# Patient Record
Sex: FEMALE | Race: WHITE | NOT HISPANIC OR LATINO
[De-identification: names, ages, dates, MRNs, and addresses within clinical notes are randomized per-mention and may not be internally consistent; named-entity substitution may affect disease eponyms.]

---

## 2018-01-18 ENCOUNTER — APPOINTMENT (OUTPATIENT)
Dept: OBGYN | Facility: CLINIC | Age: 43
End: 2018-01-18
Payer: COMMERCIAL

## 2018-01-18 VITALS
WEIGHT: 249 LBS | HEIGHT: 66 IN | SYSTOLIC BLOOD PRESSURE: 120 MMHG | BODY MASS INDEX: 40.02 KG/M2 | DIASTOLIC BLOOD PRESSURE: 84 MMHG

## 2018-01-18 DIAGNOSIS — Z80.3 FAMILY HISTORY OF MALIGNANT NEOPLASM OF BREAST: ICD-10-CM

## 2018-01-18 PROCEDURE — 99396 PREV VISIT EST AGE 40-64: CPT

## 2018-01-22 LAB
CYTOLOGY CVX/VAG DOC THIN PREP: NORMAL
HPV HIGH+LOW RISK DNA PNL CVX: NOT DETECTED

## 2018-02-01 ENCOUNTER — TRANSCRIPTION ENCOUNTER (OUTPATIENT)
Age: 43
End: 2018-02-01

## 2019-02-12 ENCOUNTER — APPOINTMENT (OUTPATIENT)
Dept: OBGYN | Facility: CLINIC | Age: 44
End: 2019-02-12
Payer: COMMERCIAL

## 2019-02-12 VITALS
DIASTOLIC BLOOD PRESSURE: 70 MMHG | SYSTOLIC BLOOD PRESSURE: 120 MMHG | BODY MASS INDEX: 40.66 KG/M2 | HEIGHT: 66 IN | WEIGHT: 253 LBS

## 2019-02-12 PROCEDURE — 99396 PREV VISIT EST AGE 40-64: CPT

## 2019-02-14 LAB — HPV HIGH+LOW RISK DNA PNL CVX: NOT DETECTED

## 2019-02-18 LAB — CYTOLOGY CVX/VAG DOC THIN PREP: NORMAL

## 2019-04-26 ENCOUNTER — TRANSCRIPTION ENCOUNTER (OUTPATIENT)
Age: 44
End: 2019-04-26

## 2020-04-16 ENCOUNTER — APPOINTMENT (OUTPATIENT)
Dept: OBGYN | Facility: CLINIC | Age: 45
End: 2020-04-16
Payer: COMMERCIAL

## 2020-04-16 VITALS
SYSTOLIC BLOOD PRESSURE: 125 MMHG | DIASTOLIC BLOOD PRESSURE: 85 MMHG | HEIGHT: 66 IN | BODY MASS INDEX: 43.71 KG/M2 | HEART RATE: 87 BPM | WEIGHT: 272 LBS

## 2020-04-16 PROCEDURE — 99214 OFFICE O/P EST MOD 30 MIN: CPT | Mod: 25

## 2020-04-16 PROCEDURE — 56405 I&D VULVA/PERINEAL ABSCESS: CPT

## 2020-04-16 RX ORDER — CEPHALEXIN 500 MG/1
500 CAPSULE ORAL
Qty: 10 | Refills: 0 | Status: ACTIVE | COMMUNITY
Start: 2020-04-16 | End: 1900-01-01

## 2020-04-16 NOTE — PHYSICAL EXAM
[Labia Majora Erythema Right] : erythema of the right labia majora [de-identified] : 2cm abscess to right labia, tender, fluctant

## 2020-04-20 LAB — BACTERIA WND CULT: ABNORMAL

## 2021-02-04 ENCOUNTER — APPOINTMENT (OUTPATIENT)
Dept: OBGYN | Facility: CLINIC | Age: 46
End: 2021-02-04
Payer: COMMERCIAL

## 2021-02-04 VITALS
SYSTOLIC BLOOD PRESSURE: 114 MMHG | HEART RATE: 69 BPM | DIASTOLIC BLOOD PRESSURE: 80 MMHG | WEIGHT: 252 LBS | BODY MASS INDEX: 40.5 KG/M2 | HEIGHT: 66 IN

## 2021-02-04 PROCEDURE — 99072 ADDL SUPL MATRL&STAF TM PHE: CPT

## 2021-02-04 PROCEDURE — 99396 PREV VISIT EST AGE 40-64: CPT

## 2021-02-04 NOTE — HISTORY OF PRESENT ILLNESS
[FreeTextEntry1] : 46 yo here for av. She has no gyn complaints today. Has a small sore lump 2 weeks ago on labia but it resolved with wrm compresses.  Her  had vasectomy. No family h/o gyn or colon cancer. [PGxTotal] : 7 [PGHxAbortions] : 1 [[facility]] : 4 [PGHxABSpont] : 2

## 2021-02-06 LAB — HPV HIGH+LOW RISK DNA PNL CVX: NOT DETECTED

## 2021-02-09 LAB — CYTOLOGY CVX/VAG DOC THIN PREP: NORMAL

## 2021-08-13 ENCOUNTER — NON-APPOINTMENT (OUTPATIENT)
Age: 46
End: 2021-08-13

## 2021-08-13 ENCOUNTER — APPOINTMENT (OUTPATIENT)
Dept: FAMILY MEDICINE | Facility: CLINIC | Age: 46
End: 2021-08-13
Payer: COMMERCIAL

## 2021-08-13 VITALS
HEART RATE: 106 BPM | SYSTOLIC BLOOD PRESSURE: 116 MMHG | HEIGHT: 66 IN | TEMPERATURE: 98.1 F | WEIGHT: 273 LBS | DIASTOLIC BLOOD PRESSURE: 84 MMHG | OXYGEN SATURATION: 98 % | BODY MASS INDEX: 43.87 KG/M2 | RESPIRATION RATE: 18 BRPM

## 2021-08-13 DIAGNOSIS — Z13.29 ENCOUNTER FOR SCREENING FOR OTHER SUSPECTED ENDOCRINE DISORDER: ICD-10-CM

## 2021-08-13 DIAGNOSIS — Z13.1 ENCOUNTER FOR SCREENING FOR DIABETES MELLITUS: ICD-10-CM

## 2021-08-13 DIAGNOSIS — G43.109 MIGRAINE WITH AURA, NOT INTRACTABLE, W/OUT STATUS MIGRAINOSUS: ICD-10-CM

## 2021-08-13 DIAGNOSIS — Z13.220 ENCOUNTER FOR SCREENING FOR LIPOID DISORDERS: ICD-10-CM

## 2021-08-13 DIAGNOSIS — Z76.89 PERSONS ENCOUNTERING HEALTH SERVICES IN OTHER SPECIFIED CIRCUMSTANCES: ICD-10-CM

## 2021-08-13 PROCEDURE — G0447 BEHAVIOR COUNSEL OBESITY 15M: CPT

## 2021-08-13 PROCEDURE — 99203 OFFICE O/P NEW LOW 30 MIN: CPT | Mod: 25

## 2021-09-23 DIAGNOSIS — R79.0 ABNORMAL LVL OF BLOOD MINERAL: ICD-10-CM

## 2022-04-14 ENCOUNTER — APPOINTMENT (OUTPATIENT)
Dept: ORTHOPEDIC SURGERY | Facility: CLINIC | Age: 47
End: 2022-04-14
Payer: COMMERCIAL

## 2022-04-14 VITALS — WEIGHT: 273 LBS | BODY MASS INDEX: 43.87 KG/M2 | HEIGHT: 66 IN

## 2022-04-14 DIAGNOSIS — M17.12 UNILATERAL PRIMARY OSTEOARTHRITIS, LEFT KNEE: ICD-10-CM

## 2022-04-14 PROCEDURE — 99213 OFFICE O/P EST LOW 20 MIN: CPT

## 2022-04-14 NOTE — IMAGING
[de-identified] : The patient is a well appearing 46 year old F of their stated age.\par Patient ambulates with a normal gait.\par Negative straight leg raise bilateral\par \par Well healed surgical incision\par \par Effected Knee: left \par ROM: 0-145 degrees\par Lachman: 2A\par Pivot Shift: Negative\par Anterior Drawer: Negative\par Posterior Drawer / Sag:Negative\par Varus Stress 0 degrees: Stable\par Varus Stress 30 degrees: Stable\par Valgus Stress 0 degrees: Stable\par Valgus Stress 30 degrees: Stable\par Medial Brian: +\par Lateral Brian: Negative\par Patella Glide: 2+\par Patella Apprehension: Negative\par Patella Grind: Negative\par Palpation:\par Medial Joint Line: Tender\par Lateral Joint Line: Nontender\par Medial Collateral Ligament: Nontender\par Lateral Collateral Ligament/PLC: Nontender\par Distal Femur: Nontender\par Proximal Tibia: Nontender\par Tibial Tubercle: Nontender\par Distal Pole Patella: Nontender\par Quadriceps Tendon: Nontender & Intact\par Patella Tendon: Nontender & Intact\par Medial Distal Hamstring/PES: Nontender\par Lateral Distal Hamstring: Nontender & Stable\par Iliotibial Band: Nontender\par Medial Patellofemoral Ligament: Nontender\par Proximal GSC-Plantaris: Nontender\par Calf: Supple & Nontender\par Inspection:\par Deformity: No\par Erythema: No\par Ecchymosis: No\par Abrasions: No\par Effusion: No\par Prepatella Bursitis: No\par Neurologic Exam:\par Sensation L4-S1: Grossly Intact\par Motor Exam:\par Quadriceps: 5 out of 5\par Hamstrings: 5 out of 5\par EHL: 5 out of 5\par FHL: 5 out of 5\par TA: 5 out of 5\par GS: 5 out of 5\par Circulatory/Pulses:\par Dorsalis Pedis: 2+\par Posterior Tibialis: 2+\par Additional Pertinent Findings: None\par Contralateral Knee: \par ROM: 0-145 degrees\par Other Pertinent Findings: None\par \par Assessment: The patient is a 46 year old F with left knee pain and radiographic and physical exam findings consistent with left knee OA\par \par The patient’s condition is acute \par Documents/Results Reviewed Today: None\par Tests/Studies Independently Interpreted Today: None \par Pertinent findings include: medial jt line tendernss, medial melida, well healed surgical incision, 2A lachman, patella crepitus \par Confounding medical conditions/concerns: none\par \par Plan: Patient reports 70% relief from CSI. Patient will continue HEP and ice. Discussed possible HA injections if pain worsens. \par Tests Ordered: none\par Prescription Medications Ordered: none\par Braces/DME Ordered: none\par Activity/Work/Sports Status: \par Additional Instructions: none\par Follow up: prn\par \par The patient's current medication management of their orthopedic diagnosis was reviewed today:\par \par (1) We discussed a comprehensive treatment plan that included possible pharmaceutical management involving the use of prescription strength medications including but not limited to options such as oral Naprosyn 500mg BID, once daily Meloxicam 15 mg, or 500-650 mg Tylenol versus over the counter oral medications and topical prescription NSAID Pennsaid vs over the counter Voltaren gel.\par \par (2) There is a moderate risk of morbidity with further treatment, especially from use of prescription strength medications and possible side effects of these medications which include upset stomach with oral medications, skin reactions to topical medications and cardiac/renal issues with long term use.\par \par (3) I recommended that the patient follow-up with their medical physician to discuss any significant specific potential issues with long term medication use such as interactions with current medications or with exacerbation of underlying medical comorbidities.\par \par (4) The benefits and risks associated with use of injectable, oral or topical, prescription and over the counter anti-inflammatory medications were discussed with the patient. The patient voiced understanding of the risks including but not limited to bleeding, stroke, kidney dysfunction, heart disease, and were referred to the black box warning label for further information.\par \par Klarissa HORTA attest that this documentation has been prepared under the direction and in the presence of provider Dr. Juárez.\par \par The documentation recorded by the scribe accurately reflects the service I personally performed and the decisions made by me.\par The patient was seen by me under the direct supervision of Dr. Jaylon Juárez\par

## 2022-04-14 NOTE — HISTORY OF PRESENT ILLNESS
[de-identified] : The patient is a 46 year old right hand dominant female who presents today complaining of left knee pain. \par Date of Injury/Onset: December 2021\par Pain: At Rest: 0/10 \par With Activity: 410 \par Mechanism of injury: Sudden onset of pain over anterior knee, sometimes posterior. \par This is not a Work Related Injury being treated under Worker's Compensation.\par This is not an athletic injury occurring associated with an interscholastic or organized sports team.\par Quality of symptoms: intermittent Throbbing at night, pressure, buckled once. Some instability in the AM when going\par down the stairs. \par Improves with: Rest\par Worse with: walking\par Treatment/Imaging/Studies Since Last Visit: PT and CSI inj\par 	Reports Available For Review Today: None\par Out of work/sport: _, since currently working\par School/Sport/Position/Occupation: \par Changes since last visit: Feeling about the same. Mild improvement. States the inj didn't help. Also states when she stopped doing PT she felt better. \par Additional Information: None

## 2022-08-23 ENCOUNTER — APPOINTMENT (OUTPATIENT)
Dept: OBGYN | Facility: CLINIC | Age: 47
End: 2022-08-23

## 2022-08-23 ENCOUNTER — NON-APPOINTMENT (OUTPATIENT)
Age: 47
End: 2022-08-23

## 2022-08-23 VITALS
BODY MASS INDEX: 43.87 KG/M2 | HEIGHT: 66 IN | WEIGHT: 273 LBS | SYSTOLIC BLOOD PRESSURE: 120 MMHG | DIASTOLIC BLOOD PRESSURE: 70 MMHG

## 2022-08-23 DIAGNOSIS — E66.01 MORBID (SEVERE) OBESITY DUE TO EXCESS CALORIES: ICD-10-CM

## 2022-08-23 DIAGNOSIS — Z12.31 ENCOUNTER FOR SCREENING MAMMOGRAM FOR MALIGNANT NEOPLASM OF BREAST: ICD-10-CM

## 2022-08-23 DIAGNOSIS — R92.2 INCONCLUSIVE MAMMOGRAM: ICD-10-CM

## 2022-08-23 PROCEDURE — 99396 PREV VISIT EST AGE 40-64: CPT

## 2022-08-23 NOTE — HISTORY OF PRESENT ILLNESS
[Currently Active] : currently active [Men] : men [Vaginal] : vaginal [No] : No [FreeTextEntry1] : 47 yo reg cycles , 4 days long 1 heavy day. SHe has another abscess to right labia. for 3 weeks, getting a bit more painfull.

## 2022-08-23 NOTE — DISCUSSION/SUMMARY
[FreeTextEntry1] : emycin for prevention of labial abscess\par bactrium for the existing lesion -cultured today\par Discussed with pt the importance of exercise 2.5 Hours min. a week as per the [employer] , Calcium equalling 1000 and vitamin D 1000 iu daily . SHe will return in one year. sent for mammo. , discussed normal signs and sx of perimenopause  normal vs not. . Importance of follow up.Answered any questions she may have. Encouraged SBE \par \par

## 2022-08-23 NOTE — PHYSICAL EXAM
[Appropriately responsive] : appropriately responsive [Alert] : alert [No Acute Distress] : no acute distress [No Lymphadenopathy] : no lymphadenopathy [Soft] : soft [Non-tender] : non-tender [Non-distended] : non-distended [No HSM] : No HSM [No Lesions] : no lesions [No Mass] : no mass [Oriented x3] : oriented x3 [Examination Of The Breasts] : a normal appearance [No Masses] : no breast masses were palpable [Labia Majora] : normal [Labia Minora] : normal [Normal] : normal [Uterine Adnexae] : normal [FreeTextEntry4] : open abscess to the right labia

## 2022-08-25 ENCOUNTER — APPOINTMENT (OUTPATIENT)
Dept: ORTHOPEDIC SURGERY | Facility: CLINIC | Age: 47
End: 2022-08-25

## 2022-08-25 VITALS — BODY MASS INDEX: 12.05 KG/M2 | WEIGHT: 75 LBS | HEIGHT: 66 IN

## 2022-08-25 LAB — HPV HIGH+LOW RISK DNA PNL CVX: NOT DETECTED

## 2022-08-25 PROCEDURE — 99214 OFFICE O/P EST MOD 30 MIN: CPT

## 2022-08-25 NOTE — HISTORY OF PRESENT ILLNESS
[de-identified] : The patient is a 46 year old right hand dominant female who presents today complaining of left knee pain. \par Date of Injury/Onset: December 2021\par Pain: At Rest: 0/10 \par With Activity: 5/10 \par Mechanism of injury: Sudden onset of pain over anterior knee, sometimes posterior. \par This is not a Work Related Injury being treated under Worker's Compensation.\par This is not an athletic injury occurring associated with an interscholastic or organized sports team.\par Quality of symptoms: intermittent Throbbing at night, pressure, clicking\par Improves with: Rest, PT\par Worse with: prolonged walking\par Treatment/Imaging/Studies Since Last Visit: PT \par 	Reports Available For Review Today: None\par Out of work/sport: currently working\par School/Sport/Position/Occupation: \par Changes since last visit: Pt is still feeling pain since last visit in April. She would like to consider visco injections\par Additional Information: None

## 2022-08-25 NOTE — IMAGING
[de-identified] : The patient is a well appearing 46 year old F of their stated age.\par Patient ambulates with a normal gait.\par Negative straight leg raise bilateral\par \par Effected Knee: left \par ROM: 0-145 degrees\par Lachman: 2A\par Pivot Shift: Negative\par Anterior Drawer: Negative\par Posterior Drawer / Sag:Negative\par Varus Stress 0 degrees: Stable\par Varus Stress 30 degrees: Stable\par Valgus Stress 0 degrees: Stable\par Valgus Stress 30 degrees: Stable\par Medial Brian: +\par Lateral Brian: Negative\par Patella Glide: 2+\par Patella Apprehension: Negative\par Patella Grind: +\par \par Palpation:\par Medial Joint Line: Tender\par Lateral Joint Line: Nontender\par Medial Collateral Ligament: Nontender\par Lateral Collateral Ligament/PLC: Nontender\par Distal Femur: Nontender\par Proximal Tibia: Nontender\par Tibial Tubercle: Nontender\par Distal Pole Patella: Nontender\par Quadriceps Tendon: Nontender & Intact\par Patella Tendon: Nontender & Intact\par Medial Distal Hamstring/PES: Nontender\par Lateral Distal Hamstring: Nontender & Stable\par Iliotibial Band: Nontender\par Medial Patellofemoral Ligament: Nontender\par Proximal GSC-Plantaris: Nontender\par Calf: Supple & Nontender\par \par Inspection:\par Deformity: No\par Erythema: No\par Ecchymosis: No\par Abrasions: No\par Effusion: Mild \par Prepatella Bursitis: No\par Neurologic Exam:\par Sensation L4-S1: Grossly Intact\par Motor Exam:\par Quadriceps: 5 out of 5\par Hamstrings: 5 out of 5\par EHL: 5 out of 5\par FHL: 5 out of 5\par TA: 5 out of 5\par GS: 5 out of 5\par Circulatory/Pulses:\par Dorsalis Pedis: 2+\par Posterior Tibialis: 2+\par Additional Pertinent Findings: well healed surgical incision \par Contralateral Knee: \par ROM: 0-145 degrees\par Other Pertinent Findings: None\par \par Assessment: The patient is a 46 year old F with left knee pain and radiographic and physical exam findings consistent with left knee OA\par \par The patient’s condition is chronic \par Documents/Results Reviewed Today: None\par Tests/Studies Independently Interpreted Today: None \par Pertinent findings include: medial joint line tenderness, mild effusion, + Medial Sheri, 2A lachman, patella crepitus, well healed surgical incision\par Confounding medical conditions/concerns: none\par \par Plan: Patient reports 70% relief from CSI. Discussed conservative treatment in form of VISCO injections. The risks, benefits, and alternatives to VISCO injections were explained in full to the patient. Patient elected to move forward with VISCO injection. Patient will await authorization from insurance. Patient recently got PRP injections in her Achilles tendon bilaterally.   \par Tests Ordered: none\par Prescription Medications Ordered: VISCO injections \par Braces/DME Ordered: none\par Activity/Work/Sports Status: \par Additional Instructions: none\par Follow up: for injections \par \par The patient's current medication management of their orthopedic diagnosis was reviewed today:\par \par (1) We discussed a comprehensive treatment plan that included possible pharmaceutical management involving the use of prescription strength medications including but not limited to options such as oral Naprosyn 500mg BID, once daily Meloxicam 15 mg, or 500-650 mg Tylenol versus over the counter oral medications and topical prescription NSAID Pennsaid vs over the counter Voltaren gel.\par \par (2) There is a moderate risk of morbidity with further treatment, especially from use of prescription strength medications and possible side effects of these medications which include upset stomach with oral medications, skin reactions to topical medications and cardiac/renal issues with long term use.\par \par (3) I recommended that the patient follow-up with their medical physician to discuss any significant specific potential issues with long term medication use such as interactions with current medications or with exacerbation of underlying medical comorbidities.\par \par (4) The benefits and risks associated with use of injectable, oral or topical, prescription and over the counter anti-inflammatory medications were discussed with the patient. The patient voiced understanding of the risks including but not limited to bleeding, stroke, kidney dysfunction, heart disease, and were referred to the black box warning label for further information.\par \par I, Shyann Haji, attest that this documentation has been prepared under the direction and in the presence of Provider Dr. Jaylon Juárez.\par \par The documentation recorded by the scribe accurately reflects the service I personally performed and the decisions made by me.\par The patient was seen by me under the direct supervision of Dr. Jaylon Juárez\par \par

## 2022-08-29 LAB
BACTERIA GENITAL AEROBE CULT: NORMAL
CYTOLOGY CVX/VAG DOC THIN PREP: NORMAL

## 2022-08-29 RX ORDER — HYALURONATE SODIUM, STABILIZED 88 MG/4 ML
88 SYRINGE (ML) INTRAARTICULAR
Qty: 1 | Refills: 0 | Status: ACTIVE | COMMUNITY
Start: 2022-08-29 | End: 1900-01-01

## 2022-11-10 ENCOUNTER — APPOINTMENT (OUTPATIENT)
Dept: ORTHOPEDIC SURGERY | Facility: CLINIC | Age: 47
End: 2022-11-10

## 2022-11-10 VITALS — BODY MASS INDEX: 41.78 KG/M2 | WEIGHT: 260 LBS | HEIGHT: 66 IN

## 2022-11-10 DIAGNOSIS — M17.12 UNILATERAL PRIMARY OSTEOARTHRITIS, LEFT KNEE: ICD-10-CM

## 2022-11-10 PROCEDURE — 99214 OFFICE O/P EST MOD 30 MIN: CPT | Mod: 25

## 2022-11-10 PROCEDURE — 20610 DRAIN/INJ JOINT/BURSA W/O US: CPT | Mod: LT

## 2022-11-11 NOTE — HISTORY OF PRESENT ILLNESS
[de-identified] : The patient is a 46 year old right hand dominant female who presents today complaining of left knee pain here today for possible gel-injection therapy. \par Date of Injury/Onset: December 2021\par Pain: At Rest: 0/10 \par With Activity: 5/10 \par Mechanism of injury: Sudden onset of pain over anterior knee, sometimes posterior. \par This is not a Work Related Injury being treated under Worker's Compensation.\par This is not an athletic injury occurring associated with an interscholastic or organized sports team.\par Quality of symptoms: intermittent Throbbing at night, pressure, clicking\par Improves with: Rest, PT\par Worse with: prolonged walking\par Treatment/Imaging/Studies Since Last Visit: PT \par 	Reports Available For Review Today: None\par Out of work/sport: currently working\par School/Sport/Position/Occupation: \par Changes since last visit: Pt is still feeling pain since last visit in April. She would like to consider visco injections\par Additional Information: None

## 2022-11-11 NOTE — IMAGING
[de-identified] : The patient is a well appearing 46 year old F of their stated age.\par Patient ambulates with a normal gait.\par Negative straight leg raise bilateral\par \par Effected Knee: left \par ROM: 0-145 degrees\par Lachman: 2A\par Pivot Shift: Negative\par Anterior Drawer: Negative\par Posterior Drawer / Sag:Negative\par Varus Stress 0 degrees: Stable\par Varus Stress 30 degrees: Stable\par Valgus Stress 0 degrees: Stable\par Valgus Stress 30 degrees: Stable\par Medial Brian: +\par Lateral Brian: Negative\par Patella Glide: 2+\par Patella Apprehension: Negative\par Patella Grind: +\par \par Palpation:\par Medial Joint Line: Tender\par Lateral Joint Line: Nontender\par Medial Collateral Ligament: Nontender\par Lateral Collateral Ligament/PLC: Nontender\par Distal Femur: Nontender\par Proximal Tibia: Nontender\par Tibial Tubercle: Nontender\par Distal Pole Patella: Nontender\par Quadriceps Tendon: Nontender & Intact\par Patella Tendon: Nontender & Intact\par Medial Distal Hamstring/PES: Nontender\par Lateral Distal Hamstring: Nontender & Stable\par Iliotibial Band: Nontender\par Medial Patellofemoral Ligament: Nontender\par Proximal GSC-Plantaris: Nontender\par Calf: Supple & Nontender\par \par Inspection:\par Deformity: No\par Erythema: No\par Ecchymosis: No\par Abrasions: No\par Effusion: Mild \par Prepatella Bursitis: No\par Neurologic Exam:\par Sensation L4-S1: Grossly Intact\par Motor Exam:\par Quadriceps: 5 out of 5\par Hamstrings: 5 out of 5\par EHL: 5 out of 5\par FHL: 5 out of 5\par TA: 5 out of 5\par GS: 5 out of 5\par Circulatory/Pulses:\par Dorsalis Pedis: 2+\par Posterior Tibialis: 2+\par Additional Pertinent Findings: well healed surgical incision \par Contralateral Knee: \par ROM: 0-145 degrees\par Other Pertinent Findings: None\par \par Assessment: The patient is a 46 year old F with left knee pain and radiographic and physical exam findings consistent with left knee OA\par \par The patient’s condition is chronic \par Documents/Results Reviewed Today: None\par Tests/Studies Independently Interpreted Today: None \par Pertinent findings include: medial joint line tenderness, mild effusion, + Medial Sheri, 2A lachman, patella crepitus, well healed surgical incision\par Confounding medical conditions/concerns: none\par \par Plan: Patient received Monovisc injection today. Modify activity as discussed. \par Tests Ordered: none\par Prescription Medications Ordered: None \par Braces/DME Ordered: none\par Activity/Work/Sports Status: \par Additional Instructions: none\par Follow up: 3-6 months \par \par \par Procedure Note: Musculoskeletal Injection   \par Diagnosis: left knee OA \par Procedure: left knee, superolateral, Monovisc \par \par Indication:  The patient has had persistent pain despite conservative treatment.  Risks, benefits and alternatives to procedure were discussed; all questions were answered to the patient's apparent satisfaction and informed consent obtained.  Consent form was signed and dated today.  The patient denied prior problems with local anesthetics, injectable cortisones, chicken allergy, coagulopathy and no relevant drug or preservative allergies or sensitivities. \par The area of injection was prepared in a sterile fashion.  Prior to injection a 'Time Out' was conducted in accordance with University policy and the site and nature of procedure verified with the patient. \par   \par Procedure: \par The injection and aspiration was carried out utilizing sterile technique from a anteriolateral arthroscopic portal position with needle placement\par  \par ( ) Diagnositic ultrasound in the long and short axis revealed medial joint line narrowing \par \par 0cc of clear synovial fluid was aspirated. \par The specimen:\par (X) appeared benign and was discarded \par ( ) was sent for Culture / Cell Count / Crystal analysis / [_]. \par   \par Injection into the target area with care taken to aspirate frequently to minimize the risk of intravascular injection was performed with: \par   \par ( ) 1cc of Depomedrol (80mg/ml) \par ( ) 1cc of Dexamethasone (10mg/ml) \par ( ) 1cc of Toradol (30mg/ml) \par ( ) 9cc of 0.5% Bupivacaine \par (X) 5cc of 1% Lidocaine \par ( ) 5cc of 32mg Zilretta, prepared and diluted per  instructions \par ( ) 2 cc of Hylan G-F 20 (Synvisc) 16mg/2ml \par ( ) 6 cc of Hylan G-F 20 (SynvisoOne) 16mg/2ml \par ( ) 2cc of Euflexxa \par (X) 2cc of Monovisc \par ( ) 2cc of GelOne \par ( ) 3cc of Durolane (20mg/ml) \par \par \par Patient tolerated the procedure well and direct pressure was applied for hemostasis. The patient was reminded of potential post-injection risks including, but not limited to, delayed hypersensitivity reactions and/or infection.  The patient verified that they had the office and the Emergency Room's contact information if any problems should arise.  After several minutes, the patient informed me that they felt fine and was released from the office.\par \par The patient's current medication management of their orthopedic diagnosis was reviewed today:\par (1) We discussed a comprehensive treatment plan that included possible pharmaceutical management involving the use of prescription strength medications including but not limited to options such as oral Naprosyn 500mg BID, once daily Meloxicam 15 mg, or 500-650 mg Tylenol versus over the counter oral medications and topical prescription NSAID Pennsaid vs over the counter Voltaren gel.\par (2) There is a moderate risk of morbidity with further treatment, especially from use of prescription strength medications and possible side effects of these medications which include upset stomach with oral medications, skin reactions to topical medications and cardiac/renal issues with long term use.\par (3) I recommended that the patient follow-up with their medical physician to discuss any significant specific potential issues with long term medication use such as interactions with current medications or with exacerbation of underlying medical comorbidities.\par (4) The benefits and risks associated with use of injectable, oral or topical, prescription and over the counter anti-inflammatory medications were discussed with the patient. The patient voiced understanding of the risks including but not limited to bleeding, stroke, kidney dysfunction, heart disease, and were referred to the black box warning label for further information.\par \par I, Shyann Haji, attest that this documentation has been prepared under the direction and in the presence of Provider Dr. Jaylon Juárez.\par \par \par The documentation recorded by the scribe accurately reflects the service I personally performed and the decisions made by me.\par The patient was seen by me under the direct supervision of Dr. Jaylon Juárez\par

## 2023-04-11 ENCOUNTER — APPOINTMENT (OUTPATIENT)
Dept: OBGYN | Facility: CLINIC | Age: 48
End: 2023-04-11
Payer: COMMERCIAL

## 2023-04-11 ENCOUNTER — RESULT CHARGE (OUTPATIENT)
Age: 48
End: 2023-04-11

## 2023-04-11 VITALS
HEIGHT: 66 IN | SYSTOLIC BLOOD PRESSURE: 118 MMHG | WEIGHT: 260 LBS | DIASTOLIC BLOOD PRESSURE: 80 MMHG | BODY MASS INDEX: 41.78 KG/M2

## 2023-04-11 LAB
BILIRUB UR QL STRIP: NORMAL
CLARITY UR: CLEAR
COLLECTION METHOD: NORMAL
GLUCOSE UR-MCNC: NORMAL
HCG UR QL: 0.2 EU/DL
HGB UR QL STRIP.AUTO: NORMAL
KETONES UR-MCNC: NORMAL
LEUKOCYTE ESTERASE UR QL STRIP: NORMAL
NITRITE UR QL STRIP: NORMAL
PH UR STRIP: 6
PROT UR STRIP-MCNC: NORMAL
SP GR UR STRIP: 1005

## 2023-04-11 PROCEDURE — 99213 OFFICE O/P EST LOW 20 MIN: CPT | Mod: 25

## 2023-04-11 PROCEDURE — 56405 I&D VULVA/PERINEAL ABSCESS: CPT

## 2023-04-11 NOTE — HISTORY OF PRESENT ILLNESS
[FreeTextEntry1] : 46 yo presents today with another abscess to the right labia. SHe states that it began getting painful a couple of days ago and began draining today . SHe has been doing warm compresses.

## 2023-04-11 NOTE — DISCUSSION/SUMMARY
[FreeTextEntry1] : relax\par warm compress, dressing \par 'after tomorrow warmsitz bath\par  bactrium\par  pt leaving in 2 days .

## 2023-04-13 LAB — BACTERIA UR CULT: NORMAL

## 2023-04-17 LAB — BACTERIA GENITAL AEROBE CULT: NORMAL

## 2024-03-14 ENCOUNTER — APPOINTMENT (OUTPATIENT)
Dept: OBGYN | Facility: CLINIC | Age: 49
End: 2024-03-14
Payer: COMMERCIAL

## 2024-03-14 VITALS
WEIGHT: 218 LBS | DIASTOLIC BLOOD PRESSURE: 79 MMHG | BODY MASS INDEX: 35.03 KG/M2 | SYSTOLIC BLOOD PRESSURE: 115 MMHG | HEIGHT: 66 IN

## 2024-03-14 DIAGNOSIS — N92.0 EXCESSIVE AND FREQUENT MENSTRUATION WITH REGULAR CYCLE: ICD-10-CM

## 2024-03-14 DIAGNOSIS — Z01.419 ENCOUNTER FOR GYNECOLOGICAL EXAMINATION (GENERAL) (ROUTINE) W/OUT ABNORMAL FINDINGS: ICD-10-CM

## 2024-03-14 PROCEDURE — 99396 PREV VISIT EST AGE 40-64: CPT

## 2024-03-14 RX ORDER — MISOPROSTOL 200 UG/1
200 TABLET ORAL
Qty: 1 | Refills: 0 | Status: ACTIVE | COMMUNITY
Start: 2024-03-14 | End: 1900-01-01

## 2024-03-14 NOTE — PHYSICAL EXAM
[Appropriately responsive] : appropriately responsive [Alert] : alert [No Acute Distress] : no acute distress [Soft] : soft [Non-tender] : non-tender [Non-distended] : non-distended [No HSM] : No HSM [No Lesions] : no lesions [No Mass] : no mass [Oriented x3] : oriented x3 [Examination Of The Breasts] : a normal appearance [No Masses] : no breast masses were palpable [Labia Minora] : normal [Labia Majora] : normal [Normal] : normal [Uterine Adnexae] : normal [Chaperone Declined] : Patient declined chaperone

## 2024-03-14 NOTE — HISTORY OF PRESENT ILLNESS
[PGxTotal] : 7 [PGHxAbortions] : 1 [facility] : 4 [PGHxABSpont] : 2 [FreeTextEntry1] :

## 2024-03-14 NOTE — DISCUSSION/SUMMARY
[FreeTextEntry1] : Well woman exam pap done mammo -utd rt in 1 year  heavy menses-I rec. she return for pelvic sono and EMB, cytotec called in, instructed on use, reason why to biopsy, pt understands, treatment strategies reviewed,

## 2024-03-16 LAB — HPV HIGH+LOW RISK DNA PNL CVX: NOT DETECTED

## 2024-03-19 LAB — CYTOLOGY CVX/VAG DOC THIN PREP: NORMAL

## 2024-04-08 ENCOUNTER — APPOINTMENT (OUTPATIENT)
Dept: ANTEPARTUM | Facility: CLINIC | Age: 49
End: 2024-04-08
Payer: COMMERCIAL

## 2024-04-08 ENCOUNTER — ASOB RESULT (OUTPATIENT)
Age: 49
End: 2024-04-08

## 2024-04-08 ENCOUNTER — APPOINTMENT (OUTPATIENT)
Dept: OBGYN | Facility: CLINIC | Age: 49
End: 2024-04-08
Payer: COMMERCIAL

## 2024-04-08 VITALS
SYSTOLIC BLOOD PRESSURE: 120 MMHG | DIASTOLIC BLOOD PRESSURE: 70 MMHG | HEIGHT: 66 IN | BODY MASS INDEX: 35.03 KG/M2 | WEIGHT: 218 LBS

## 2024-04-08 DIAGNOSIS — N92.6 IRREGULAR MENSTRUATION, UNSPECIFIED: ICD-10-CM

## 2024-04-08 LAB — HCG UR QL: NEGATIVE

## 2024-04-08 PROCEDURE — 58100 BIOPSY OF UTERUS LINING: CPT

## 2024-04-08 PROCEDURE — 76856 US EXAM PELVIC COMPLETE: CPT | Mod: 59

## 2024-04-08 PROCEDURE — 76830 TRANSVAGINAL US NON-OB: CPT

## 2024-04-08 PROCEDURE — 99214 OFFICE O/P EST MOD 30 MIN: CPT | Mod: 25

## 2024-04-08 PROCEDURE — 81025 URINE PREGNANCY TEST: CPT

## 2024-04-08 RX ORDER — MEDROXYPROGESTERONE ACETATE 10 MG/1
10 TABLET ORAL DAILY
Qty: 10 | Refills: 0 | Status: ACTIVE | COMMUNITY
Start: 2024-04-08 | End: 1900-01-01

## 2024-04-08 NOTE — HISTORY OF PRESENT ILLNESS
[FreeTextEntry1] : 47 yo here for review of pelvic sono and emb. Her periods are heavy, changing every 1-1.5 hours days 1-2 and for the most part are regular but this last period she had 46 days ago. Her endometrial lining is 18.51 mm, remainder of sono is wnl.

## 2024-04-08 NOTE — PLAN
[FreeTextEntry1] : Heavy menses/skipped period EMB done today-pt tolerated well, will call with results. Treatment options depend on results, if negative, we can consider uterine ablation, LARC, POP, literature given about both options. Provera 10mg one tab po for 10 days ordered rt if sx worsn

## 2024-04-12 LAB — CORE LAB BIOPSY: NORMAL

## 2024-06-25 ENCOUNTER — APPOINTMENT (OUTPATIENT)
Dept: OBGYN | Facility: CLINIC | Age: 49
End: 2024-06-25
Payer: COMMERCIAL

## 2024-06-25 VITALS
DIASTOLIC BLOOD PRESSURE: 60 MMHG | BODY MASS INDEX: 34.55 KG/M2 | WEIGHT: 215 LBS | SYSTOLIC BLOOD PRESSURE: 124 MMHG | HEIGHT: 66 IN

## 2024-06-25 DIAGNOSIS — N76.4 ABSCESS OF VULVA: ICD-10-CM

## 2024-06-25 PROCEDURE — 99214 OFFICE O/P EST MOD 30 MIN: CPT

## 2024-06-25 RX ORDER — ERYTHROMYCIN 20 MG/ML
2 SOLUTION TOPICAL TWICE DAILY
Qty: 1 | Refills: 4 | Status: ACTIVE | COMMUNITY
Start: 2022-08-23 | End: 1900-01-01

## 2024-06-25 RX ORDER — SULFAMETHOXAZOLE AND TRIMETHOPRIM 800; 160 MG/1; MG/1
800-160 TABLET ORAL TWICE DAILY
Qty: 10 | Refills: 0 | Status: ACTIVE | COMMUNITY
Start: 2022-08-23 | End: 1900-01-01

## 2025-05-20 ENCOUNTER — NON-APPOINTMENT (OUTPATIENT)
Age: 50
End: 2025-05-20

## 2025-05-29 ENCOUNTER — APPOINTMENT (OUTPATIENT)
Dept: OBGYN | Facility: CLINIC | Age: 50
End: 2025-05-29
Payer: COMMERCIAL

## 2025-05-29 VITALS
WEIGHT: 222 LBS | SYSTOLIC BLOOD PRESSURE: 120 MMHG | DIASTOLIC BLOOD PRESSURE: 60 MMHG | HEIGHT: 66 IN | BODY MASS INDEX: 35.68 KG/M2

## 2025-05-29 DIAGNOSIS — Z01.419 ENCOUNTER FOR GYNECOLOGICAL EXAMINATION (GENERAL) (ROUTINE) W/OUT ABNORMAL FINDINGS: ICD-10-CM

## 2025-05-29 PROCEDURE — 99396 PREV VISIT EST AGE 40-64: CPT

## 2025-06-03 LAB
CYTOLOGY CVX/VAG DOC THIN PREP: NORMAL
HPV HIGH+LOW RISK DNA PNL CVX: NOT DETECTED

## 2025-08-18 ENCOUNTER — APPOINTMENT (OUTPATIENT)
Dept: OBGYN | Facility: CLINIC | Age: 50
End: 2025-08-18
Payer: COMMERCIAL

## 2025-08-18 VITALS
SYSTOLIC BLOOD PRESSURE: 107 MMHG | WEIGHT: 225 LBS | HEIGHT: 66 IN | BODY MASS INDEX: 36.16 KG/M2 | DIASTOLIC BLOOD PRESSURE: 75 MMHG

## 2025-08-18 DIAGNOSIS — N92.0 EXCESSIVE AND FREQUENT MENSTRUATION WITH REGULAR CYCLE: ICD-10-CM

## 2025-08-18 PROCEDURE — 99203 OFFICE O/P NEW LOW 30 MIN: CPT

## 2025-08-18 RX ORDER — MISOPROSTOL 200 UG/1
200 TABLET ORAL
Qty: 2 | Refills: 0 | Status: ACTIVE | COMMUNITY
Start: 2025-08-18 | End: 1900-01-01

## 2025-08-19 ENCOUNTER — ASOB RESULT (OUTPATIENT)
Age: 50
End: 2025-08-19

## 2025-08-19 ENCOUNTER — APPOINTMENT (OUTPATIENT)
Dept: ANTEPARTUM | Facility: CLINIC | Age: 50
End: 2025-08-19
Payer: COMMERCIAL

## 2025-08-19 PROCEDURE — 76856 US EXAM PELVIC COMPLETE: CPT | Mod: 59

## 2025-08-19 PROCEDURE — 76830 TRANSVAGINAL US NON-OB: CPT

## 2025-09-02 ENCOUNTER — RX RENEWAL (OUTPATIENT)
Age: 50
End: 2025-09-02

## 2025-09-05 ENCOUNTER — APPOINTMENT (OUTPATIENT)
Dept: OBGYN | Facility: CLINIC | Age: 50
End: 2025-09-05

## 2025-09-15 ENCOUNTER — APPOINTMENT (OUTPATIENT)
Dept: OBGYN | Facility: CLINIC | Age: 50
End: 2025-09-15
Payer: COMMERCIAL

## 2025-09-15 VITALS
SYSTOLIC BLOOD PRESSURE: 111 MMHG | WEIGHT: 225 LBS | DIASTOLIC BLOOD PRESSURE: 73 MMHG | BODY MASS INDEX: 36.16 KG/M2 | HEIGHT: 66 IN

## 2025-09-15 DIAGNOSIS — R93.89 ABNORMAL FINDINGS ON DIAGNOSTIC IMAGING OF OTHER SPECIFIED BODY STRUCTURES: ICD-10-CM

## 2025-09-15 DIAGNOSIS — N92.0 EXCESSIVE AND FREQUENT MENSTRUATION WITH REGULAR CYCLE: ICD-10-CM

## 2025-09-15 PROCEDURE — 99213 OFFICE O/P EST LOW 20 MIN: CPT | Mod: 57

## 2025-09-15 PROCEDURE — 58558Z: CUSTOM

## 2025-09-15 PROCEDURE — 81025 URINE PREGNANCY TEST: CPT

## 2025-09-15 PROCEDURE — 99459 PELVIC EXAMINATION: CPT

## 2025-09-17 LAB
HCG UR QL: NEGATIVE
QUALITY CONTROL: YES

## 2025-09-18 ENCOUNTER — RX RENEWAL (OUTPATIENT)
Age: 50
End: 2025-09-18

## 2025-09-18 LAB — CORE LAB BIOPSY: NORMAL
